# Patient Record
Sex: MALE | Race: WHITE | URBAN - METROPOLITAN AREA
[De-identification: names, ages, dates, MRNs, and addresses within clinical notes are randomized per-mention and may not be internally consistent; named-entity substitution may affect disease eponyms.]

---

## 2020-01-13 ENCOUNTER — APPOINTMENT (RX ONLY)
Dept: URBAN - METROPOLITAN AREA CLINIC 31 | Facility: CLINIC | Age: 40
Setting detail: DERMATOLOGY
End: 2020-01-13

## 2020-01-13 PROBLEM — D48.5 NEOPLASM OF UNCERTAIN BEHAVIOR OF SKIN: Status: ACTIVE | Noted: 2020-01-13

## 2020-01-13 PROCEDURE — ? BIOPSY BY SHAVE METHOD

## 2020-01-13 PROCEDURE — 11102 TANGNTL BX SKIN SINGLE LES: CPT

## 2020-01-13 NOTE — PROCEDURE: BIOPSY BY SHAVE METHOD
Was A Bandage Applied: Yes
Hide Topical Anesthesia?: No
Dressing: Band-Aid
Anesthesia Volume In Cc (Will Not Render If 0): 0.5
Electrodesiccation And Curettage Text: The wound bed was treated with electrodesiccation and curettage after the biopsy was performed.
Billing Type: Third-Party Bill
Lab: -[id number]
Curettage Text: The wound bed was treated with curettage after the biopsy was performed.
Detail Level: Detailed
Biopsy Method: Personna blade
Hemostasis: Electrocautery
Silver Nitrate Text: The wound bed was treated with silver nitrate after the biopsy was performed.
Notification Instructions: Patient will be notified of biopsy results. However, patient instructed to call the office if not contacted within 2 weeks.
Electrodesiccation Text: The wound bed was treated with electrodesiccation after the biopsy was performed.
Additional Anesthesia Volume In Cc (Will Not Render If 0): 0
Wound Care: Petrolatum
Lab Facility: [id number]
Anesthesia Type: 1% lidocaine with epinephrine
Post-Care Instructions: I reviewed with the patient in detail post-care instructions. Patient is to wash biopsy site daily and apply Vaseline daily until healed.
Cryotherapy Text: The wound bed was treated with cryotherapy after the biopsy was performed.
Consent: Written consent was obtained and risks were reviewed including but not limited to scarring, infection, bleeding, scabbing, incomplete removal, nerve damage and allergy to anesthesia.
Type Of Destruction Used: Curettage
Biopsy Type: H and E
Depth Of Biopsy: dermis

## 2025-07-26 ENCOUNTER — APPOINTMENT (OUTPATIENT)
Age: 45
End: 2025-07-26
Attending: INTERNAL MEDICINE
Payer: COMMERCIAL

## 2025-07-26 ENCOUNTER — OFFICE VISIT (OUTPATIENT)
Age: 45
End: 2025-07-26

## 2025-07-26 VITALS
RESPIRATION RATE: 20 BRPM | DIASTOLIC BLOOD PRESSURE: 81 MMHG | BODY MASS INDEX: 23.29 KG/M2 | HEIGHT: 72 IN | HEART RATE: 67 BPM | SYSTOLIC BLOOD PRESSURE: 124 MMHG | OXYGEN SATURATION: 97 % | WEIGHT: 171.96 LBS | TEMPERATURE: 97.4 F

## 2025-07-26 DIAGNOSIS — M79.671 RIGHT FOOT PAIN: Primary | ICD-10-CM

## 2025-07-26 PROCEDURE — 73630 X-RAY EXAM OF FOOT: CPT

## 2025-07-26 NOTE — PROGRESS NOTES
[facility]  Name: Oliverio Butler      : 1980      MRN: [id number]  Encounter Provider: LISY MAHMOOD  Encounter Date: 2025   Encounter department: [facility]  :  Assessment & Plan  Right foot pain    Orders:    XR foot 3+ vw right        Patient Instructions  The x-ray did not show a fracture or any other abnormalities  Follow up with your PCP or orthopedics if pain continues for further evaluation and x-rays   Proceed to  ER if symptoms worsen.    If tests are performed, our office will contact you with results only if changes need to made to the care plan discussed with you at the visit. [facility]    Chief Complaint:   Chief Complaint   Patient presents with    Foot Pain     PT arrives with RIGHT foot pain in the middle of his foot, no swelling, able to ambulate independently but painful. Hurts more in the AM. Started about 5-6d ago. No known origin of pain. No previous issues with foot. No obvious deformity/swelling.     History of Present Illness   PT arrives with RIGHT foot pain in the middle of his foot, no swelling, able to ambulate independently but painful. Hurts more in the AM. Started about 5-6d ago. No known origin of pain. No previous issues with foot. No obvious deformity/swelling.)          Review of Systems   Constitutional: Negative.  Negative for activity change.   Musculoskeletal:  Positive for arthralgias. Negative for gait problem, joint swelling and myalgias.   Skin:  Negative for color change and rash.   Neurological:  Negative for weakness and numbness.     Past Medical History   Past Medical History[1]  Past Surgical History[2]  Family History[3]  he reports that he quit smoking about 20 years ago. His smoking use included cigarettes. He has never used smokeless tobacco. He reports that he does not drink alcohol and does not use drugs.  No current outpatient medicationsAllergies[4]  "    Objective   /81 (BP Location: Right arm, Patient Position: Sitting, Cuff Size: Standard)   Pulse 67   Temp (!) 97.4 °F (36.3 °C) (Temporal)   Resp 20   Ht 6' (1.829 m)   Wt 78 kg (171 lb 15.3 oz)   SpO2 97%   BMI 23.32 kg/m²      Physical Exam  Vitals and nursing note reviewed.   Constitutional:       Appearance: Normal appearance.     Cardiovascular:      Rate and Rhythm: Normal rate and regular rhythm.      Pulses:           Dorsalis pedis pulses are 2+ on the right side and 2+ on the left side.        Posterior tibial pulses are 2+ on the right side and 2+ on the left side.   Pulmonary:      Effort: Pulmonary effort is normal.      Breath sounds: Normal breath sounds.     Musculoskeletal:        Feet:    Feet:      Right foot:      Skin integrity: Skin integrity normal.      Left foot:      Skin integrity: Skin integrity normal.     Neurological:      Mental Status: He is alert.         Portions of the record may have been created with voice recognition software.  Occasional wrong word or \"sound a like\" substitutions may have occurred due to the inherent limitations of voice recognition software.  Read the chart carefully and recognize, using context, where substitutions have occurred.       [1]   Past Medical History:  Diagnosis Date    Atrial fibrillation (HCC)     Juvenile rheumatic fever     Obsessive compulsive disorder     Tinnitus    [2]   Past Surgical History:  Procedure Laterality Date    MENISCECTOMY Left    [3] No family history on file.  [4]   Allergies  Allergen Reactions    Cefaclor Other (See Comments)     From when he was an infant    Penicillins Other (See Comments)     From when he was an infant    Sulfa Antibiotics Other (See Comments)     From when he was an infant     "

## 2025-07-26 NOTE — PATIENT INSTRUCTIONS
Patient Instructions  The x-ray did not show a fracture or any other abnormalities  Follow up with your PCP or orthopedics if pain continues for further evaluation and x-rays   Proceed to  ER if symptoms worsen.    If tests are performed, our office will contact you with results only if changes need to made to the care plan discussed with you at the visit. You can review your full results on [facility].